# Patient Record
Sex: FEMALE | Race: WHITE | NOT HISPANIC OR LATINO | Employment: OTHER | URBAN - METROPOLITAN AREA
[De-identification: names, ages, dates, MRNs, and addresses within clinical notes are randomized per-mention and may not be internally consistent; named-entity substitution may affect disease eponyms.]

---

## 2021-05-10 ENCOUNTER — PATIENT MESSAGE (OUTPATIENT)
Dept: RESEARCH | Facility: HOSPITAL | Age: 61
End: 2021-05-10

## 2022-01-01 ENCOUNTER — TELEPHONE (OUTPATIENT)
Dept: CARDIOLOGY | Facility: CLINIC | Age: 62
End: 2022-01-01

## 2022-12-23 PROBLEM — R06.09 DOE (DYSPNEA ON EXERTION): Status: ACTIVE | Noted: 2022-01-01

## 2022-12-23 PROBLEM — J84.9 INTERSTITIAL PULMONARY DISEASE, UNSPECIFIED: Status: ACTIVE | Noted: 2022-01-01

## 2022-12-23 PROBLEM — Z86.010 PERSONAL HISTORY OF COLONIC POLYPS: Status: ACTIVE | Noted: 2022-01-01

## 2022-12-23 PROBLEM — R94.39 POSITIVE CARDIAC STRESS TEST: Status: ACTIVE | Noted: 2022-01-01

## 2022-12-28 NOTE — TELEPHONE ENCOUNTER
----- Message from Amelia Malin LPN sent at 12/23/2022  1:41 PM CST -----    ----- Message -----  From: Angela Baker  Sent: 12/23/2022   1:34 PM CST  To: Milana GARCIA Staff    Type:  Sooner Apoointment Request    Caller is requesting a sooner appointment.  Caller declined first available appointment listed below.  Caller will not accept being placed on the waitlist and is requesting a message be sent to doctor.  Name of Caller:Pt  When is the first available appointment?1/24     Symptoms:  R06.09 (ICD-10-CM) - RAMIREZ (dyspnea on exertion)  R94.39 (ICD-10-CM) - Positive cardiac stress test  R00.2 (ICD-10-CM) - Palpitations    Would the patient rather a call back or a response via [facility]? Call  Best Call Back Number:[phone]    Additional Information: Pt was referred by Dr. Lynn need a sooner appt. based on above diagnosis.

## 2022-12-29 PROBLEM — R59.0 LYMPHADENOPATHY, MEDIASTINAL: Status: ACTIVE | Noted: 2022-01-01

## 2022-12-29 PROBLEM — Z87.891 HISTORY OF TOBACCO USE DISORDER: Status: ACTIVE | Noted: 2022-01-01

## 2022-12-29 PROBLEM — R91.8 PULMONARY NODULES/LESIONS, MULTIPLE: Status: ACTIVE | Noted: 2022-01-01

## 2022-12-29 PROBLEM — J84.9 INTERSTITIAL PULMONARY DISEASE, UNSPECIFIED: Status: RESOLVED | Noted: 2022-01-01 | Resolved: 2022-01-01

## 2022-12-29 PROBLEM — J43.9 PULMONARY EMPHYSEMA: Status: ACTIVE | Noted: 2022-01-01

## 2023-01-01 ENCOUNTER — OFFICE VISIT (OUTPATIENT)
Dept: CARDIOLOGY | Facility: CLINIC | Age: 63
End: 2023-01-01
Payer: MEDICARE

## 2023-01-01 VITALS
WEIGHT: 253.06 LBS | HEIGHT: 66 IN | DIASTOLIC BLOOD PRESSURE: 61 MMHG | SYSTOLIC BLOOD PRESSURE: 135 MMHG | BODY MASS INDEX: 40.67 KG/M2 | HEART RATE: 100 BPM

## 2023-01-01 VITALS
SYSTOLIC BLOOD PRESSURE: 134 MMHG | WEIGHT: 250 LBS | HEART RATE: 97 BPM | HEIGHT: 66 IN | DIASTOLIC BLOOD PRESSURE: 69 MMHG | BODY MASS INDEX: 40.18 KG/M2

## 2023-01-01 DIAGNOSIS — J84.9 ILD (INTERSTITIAL LUNG DISEASE): ICD-10-CM

## 2023-01-01 DIAGNOSIS — R00.2 PALPITATIONS: ICD-10-CM

## 2023-01-01 DIAGNOSIS — R94.39 POSITIVE CARDIAC STRESS TEST: ICD-10-CM

## 2023-01-01 DIAGNOSIS — I10 PRIMARY HYPERTENSION: ICD-10-CM

## 2023-01-01 DIAGNOSIS — E66.01 MORBID OBESITY WITH BMI OF 40.0-44.9, ADULT: Primary | ICD-10-CM

## 2023-01-01 DIAGNOSIS — R06.09 DOE (DYSPNEA ON EXERTION): ICD-10-CM

## 2023-01-01 PROCEDURE — 99999 PR PBB SHADOW E&M-EST. PATIENT-LVL III: CPT | Mod: PBBFAC,,,

## 2023-01-01 PROCEDURE — 1159F PR MEDICATION LIST DOCUMENTED IN MEDICAL RECORD: ICD-10-PCS | Mod: CPTII,S$GLB,, | Performed by: INTERNAL MEDICINE

## 2023-01-01 PROCEDURE — 1159F PR MEDICATION LIST DOCUMENTED IN MEDICAL RECORD: ICD-10-PCS | Mod: CPTII,S$GLB,,

## 2023-01-01 PROCEDURE — 99214 OFFICE O/P EST MOD 30 MIN: CPT | Mod: S$GLB,,,

## 2023-01-01 PROCEDURE — 3075F SYST BP GE 130 - 139MM HG: CPT | Mod: CPTII,S$GLB,,

## 2023-01-01 PROCEDURE — 3008F BODY MASS INDEX DOCD: CPT | Mod: CPTII,S$GLB,,

## 2023-01-01 PROCEDURE — 99999 PR PBB SHADOW E&M-EST. PATIENT-LVL III: ICD-10-PCS | Mod: PBBFAC,,,

## 2023-01-01 PROCEDURE — 99999 PR PBB SHADOW E&M-EST. PATIENT-LVL V: CPT | Mod: PBBFAC,,, | Performed by: INTERNAL MEDICINE

## 2023-01-01 PROCEDURE — 3008F BODY MASS INDEX DOCD: CPT | Mod: CPTII,S$GLB,, | Performed by: INTERNAL MEDICINE

## 2023-01-01 PROCEDURE — 3078F DIAST BP <80 MM HG: CPT | Mod: CPTII,S$GLB,, | Performed by: INTERNAL MEDICINE

## 2023-01-01 PROCEDURE — 1160F PR REVIEW ALL MEDS BY PRESCRIBER/CLIN PHARMACIST DOCUMENTED: ICD-10-PCS | Mod: CPTII,S$GLB,, | Performed by: INTERNAL MEDICINE

## 2023-01-01 PROCEDURE — 3044F HG A1C LEVEL LT 7.0%: CPT | Mod: CPTII,S$GLB,,

## 2023-01-01 PROCEDURE — 99204 PR OFFICE/OUTPT VISIT, NEW, LEVL IV, 45-59 MIN: ICD-10-PCS | Mod: S$GLB,,, | Performed by: INTERNAL MEDICINE

## 2023-01-01 PROCEDURE — 1159F MED LIST DOCD IN RCRD: CPT | Mod: CPTII,S$GLB,, | Performed by: INTERNAL MEDICINE

## 2023-01-01 PROCEDURE — 3044F PR MOST RECENT HEMOGLOBIN A1C LEVEL <7.0%: ICD-10-PCS | Mod: CPTII,S$GLB,,

## 2023-01-01 PROCEDURE — 3078F PR MOST RECENT DIASTOLIC BLOOD PRESSURE < 80 MM HG: ICD-10-PCS | Mod: CPTII,S$GLB,,

## 2023-01-01 PROCEDURE — 3008F PR BODY MASS INDEX (BMI) DOCUMENTED: ICD-10-PCS | Mod: CPTII,S$GLB,, | Performed by: INTERNAL MEDICINE

## 2023-01-01 PROCEDURE — 3008F PR BODY MASS INDEX (BMI) DOCUMENTED: ICD-10-PCS | Mod: CPTII,S$GLB,,

## 2023-01-01 PROCEDURE — 3075F SYST BP GE 130 - 139MM HG: CPT | Mod: CPTII,S$GLB,, | Performed by: INTERNAL MEDICINE

## 2023-01-01 PROCEDURE — 99999 PR PBB SHADOW E&M-EST. PATIENT-LVL V: ICD-10-PCS | Mod: PBBFAC,,, | Performed by: INTERNAL MEDICINE

## 2023-01-01 PROCEDURE — 99204 OFFICE O/P NEW MOD 45 MIN: CPT | Mod: S$GLB,,, | Performed by: INTERNAL MEDICINE

## 2023-01-01 PROCEDURE — 3078F DIAST BP <80 MM HG: CPT | Mod: CPTII,S$GLB,,

## 2023-01-01 PROCEDURE — 99214 PR OFFICE/OUTPT VISIT, EST, LEVL IV, 30-39 MIN: ICD-10-PCS | Mod: S$GLB,,,

## 2023-01-01 PROCEDURE — 1159F MED LIST DOCD IN RCRD: CPT | Mod: CPTII,S$GLB,,

## 2023-01-01 PROCEDURE — 3075F PR MOST RECENT SYSTOLIC BLOOD PRESS GE 130-139MM HG: ICD-10-PCS | Mod: CPTII,S$GLB,,

## 2023-01-01 PROCEDURE — 1160F RVW MEDS BY RX/DR IN RCRD: CPT | Mod: CPTII,S$GLB,, | Performed by: INTERNAL MEDICINE

## 2023-01-01 PROCEDURE — 3075F PR MOST RECENT SYSTOLIC BLOOD PRESS GE 130-139MM HG: ICD-10-PCS | Mod: CPTII,S$GLB,, | Performed by: INTERNAL MEDICINE

## 2023-01-01 PROCEDURE — 3078F PR MOST RECENT DIASTOLIC BLOOD PRESSURE < 80 MM HG: ICD-10-PCS | Mod: CPTII,S$GLB,, | Performed by: INTERNAL MEDICINE

## 2023-01-01 RX ORDER — SODIUM CHLORIDE 9 MG/ML
INJECTION, SOLUTION INTRAVENOUS ONCE
Status: CANCELLED | OUTPATIENT
Start: 2023-01-01 | End: 2023-01-01

## 2023-01-01 RX ORDER — NAPROXEN SODIUM 220 MG/1
81 TABLET, FILM COATED ORAL ONCE
Status: CANCELLED | OUTPATIENT
Start: 2023-01-01 | End: 2023-01-01

## 2023-01-01 RX ORDER — CLOPIDOGREL BISULFATE 75 MG/1
300 TABLET ORAL ONCE
Status: CANCELLED | OUTPATIENT
Start: 2023-01-01 | End: 2023-01-01

## 2023-01-24 NOTE — PROGRESS NOTES
Patient, Angela Byrd (MRN #[id number]), presented with a recorded BMI of 40.85 kg/m^2 consistent with the definition of morbid obesity (ICD-10 E66.01). The patient's morbid obesity was monitored, evaluated, addressed and/or treated. This addendum to the medical record is made on 01/24/2023.

## 2023-01-24 NOTE — PATIENT INSTRUCTIONS
Angiogram    Arrive for procedure at: [facility]. 2/10/23 @ 10 AM.  YOU MAY ENTER THROUGH THE MAIN ENTRANCE.  LET THEM KNOW YOU ARE THERE FOR AN OUTPATIENT PROCEDURE.  THE PROCEDURE WILL START AT 12 NOON WITH DR.LEO WOODS.     You will receive a phone call from [facility] Pre-Op Department with further instructions prior to your scheduled procedure.      FASTING: You MAY NOT have anything to eat or drink AFTER MIDNIGHT the day before your procedure. If your procedure is scheduled in the afternoon, you may have a LIGHT BREAKFAST 6-8 hours prior to your procedure.  For example: Two slices of toast; black coffee or black tea.    MEDICATIONS: You may take your regular morning medications with water. If there are any medications that you should not take, you will be instructed to hold them for that morning.    CARDIOLOGY PRE-PROCEDURE MEDICATION ORDERS:  ** Please hold any medications that are checked below:    HOLD   # OF DAYS TO HOLD  Coumadin   Consult with Coumadin Clinic   Xarelto    _DAY BEFORE & DAY OF_  Pradaxa  _ DAY BEFORE & DAY OF _  Eliquis   _ DAY BEFORE & DAY OF _  Metformin    Day before procedure & morning of procedure  Short acting insulin   Morning of procedure    CONTINUE the Following Medications   Plavix      Effient     Aspirin    WHAT TO EXPECT:    How long will the procedure take?  The procedure will take an average of 1 - 2 hours to perform.  After the procedure, you will need to lay flat for around 4 - 6 hours to minimize bleeding from the puncture site. If the wrist is accessed you will need to keep your arm still as instructed by the nurse.    When can I go home?  You may be able to be discharged home that same afternoon if there were no complications.  If you have one of the following: balloon; stent; pacemaker or defibrillator procedures, you may spend one night for observation.  Your doctor will determine your discharge based upon your progress.  The results of your  procedure will be discussed with you before you are discharged.  Any further testing or procedures will be scheduled for you either before you leave or you will be instructed to call for a future appointment.      TRANSPORTATION:  PLEASE ARRANGE TO HAVE SOMEONE DRIVE YOU HOME FOLLOWING YOUR PROCEDURE, YOU WILL NOT BE ALLOWED TO DRIVE.

## 2023-01-24 NOTE — PROGRESS NOTES
Subjective:    Patient ID:  Angela Byrd is a 62 y.o. female who presents for evaluation of Shortness of Breath      HPI  She comes with worsening SOB with exertion  (+) stress test last year  Seeing dr Ed garcia    Review of Systems   Constitutional: Negative for decreased appetite, malaise/fatigue, weight gain and weight loss.   Cardiovascular:  Negative for chest pain, dyspnea on exertion, leg swelling, palpitations and syncope.   Respiratory:  Negative for cough and shortness of breath.    Gastrointestinal: Negative.    Neurological:  Negative for weakness.   All other systems reviewed and are negative.     Objective:      Physical Exam  Vitals and nursing note reviewed.   Constitutional:       Appearance: Normal appearance. She is well-developed.   HENT:      Head: Normocephalic.   Eyes:      Pupils: Pupils are equal, round, and reactive to light.   Neck:      Thyroid: No thyromegaly.      Vascular: No carotid bruit or JVD.   Cardiovascular:      Rate and Rhythm: Normal rate and regular rhythm.      Chest Wall: PMI is not displaced.      Pulses: Normal pulses and intact distal pulses.      Heart sounds: Normal heart sounds. No murmur heard.    No gallop.   Pulmonary:      Effort: Pulmonary effort is normal.      Breath sounds: Normal breath sounds.   Abdominal:      Palpations: Abdomen is soft. There is no mass.      Tenderness: There is no abdominal tenderness.   Musculoskeletal:         General: Normal range of motion.      Cervical back: Normal range of motion and neck supple.   Skin:     General: Skin is warm.   Neurological:      Mental Status: She is alert and oriented to person, place, and time.      Sensory: No sensory deficit.      Deep Tendon Reflexes: Reflexes are normal and symmetric.         Assessment:       1. Chronic Dyspnea On Exertion    2. Positive Cardiac Nuclear Stress Test    3. Palpitations With PACs         Plan:     L/R  heart cath

## 2023-01-31 PROBLEM — R94.2 ABNORMAL PFT: Status: ACTIVE | Noted: 2023-01-01

## 2023-01-31 PROBLEM — J43.2 CENTRIACINAR EMPHYSEMA: Status: ACTIVE | Noted: 2023-01-01

## 2023-01-31 PROBLEM — R59.9 ENLARGED LYMPH NODES: Status: ACTIVE | Noted: 2023-01-01

## 2023-01-31 PROBLEM — J43.9 PULMONARY EMPHYSEMA: Status: RESOLVED | Noted: 2022-01-01 | Resolved: 2023-01-01

## 2023-01-31 PROBLEM — Z99.81 SUPPLEMENTAL OXYGEN DEPENDENT: Status: ACTIVE | Noted: 2023-01-01

## 2023-01-31 PROBLEM — J47.9 TRACTION BRONCHIECTASIS: Status: ACTIVE | Noted: 2023-01-01

## 2023-02-23 NOTE — PROGRESS NOTES
Subjective:    Patient ID:  Angela Byrd is a 62 y.o. female patient here for evaluation Follow-up      History of Present Illness:     Mrs. Miller in a pleasant 62 year old F who follows with Dr. Cortés here today for RLHC follow up. She underwent RLHC after a small reversible defect was found in the anterolateral walls on a nuclear stress. She has chronic RAMIREZ given ILD and is on 2.5 L during the day and 4 L of home O2 at night. She follows with Dr. Ward for this. RHC revealed normal heart pressures but moderate to severe PH. LHC revealed normal coronary arteries.   Echo 9/2022 EF 60% otherwise WNL.         Review of patient's allergies indicates:   Allergen Reactions    Evista [raloxifene] Other (See Comments)     Headache, arthralgia, and leg pain    Fosamax [alendronate] Other (See Comments)     Jaw necrosis       Past Medical History:   Diagnosis Date    a Family H/O Early CAD     Dr. Frank Cortés; On ASA 81 Mg Daily    a Palpitations With PACs     Dr. Frank Cortés; On ASA 81 Mg Daily; 9/20/13 EKG = NSR With 1 PAC; 09/2013 Lab W/U = Normal    a Positive Cardiac Nuclear Stress Test 01/31/2023    Dr. Frank Cortés Has Scheduled A LHC/Angiogram On 2/10/23; On ASA 81 Mg Daily; Her 9/20/22 Cardiac Nuclear Stress Test = Was Positive    b Hypertension     09/2015 RXd Low Salt Diet     d Hyperglycemia With Family H/O DM     1/23/22 And 7/4/20 RXd Lifestyle Changes; Her Mother    e Hypothyroidism     1/26/21 RXd Levothyroxine 25 Mcg qAM     f Morbid Obesity     f Severe Osteopenia     Cannot Tolerate Biphosphonates (Jaw Necrosis) Or Evista (HAs And Arthralgias); On OTC D3 5K IU Daily And OTC CaCO3 600 Mg Daily    i Chronic Dyspnea On Exertion     Dr. Koko Ward And 12/29/22 RXd Advair BID And Albuterol MDI PRN; 12/29/22 Chest CT Scan W/O Contrast = (See Report); 9/2/22 CXR = (See Report)    i H/O 1.5 PPD X 20 Years TUD, Quit In 2004     Dr. Koko Ward; 12/29/22 Chest CT Scan W/O  "Contrast = (See Report); 9/2/22 CXR = (See Report)    i Mediastinal Lymphadenopathy     Dr. Koko Ward; 12/29/22 Chest CT Scan W/O Contrast = (See Report); 9/2/22 CXR = (See Report)    i Multiple Subcentimeter Pulmonary Nodules     Dr. Koko Ward; 12/29/22 Chest CT Scan W/O Contrast = (See Report); 9/2/22 CXR = (See Report)    i Severe Centriacinar Emphysema 01/31/2023    Dr. Koko Ward    i Supplemental Oxygen Dependence 01/31/2023    Dr. Koko Ward; Her 1/31/23 Pulse Ox Was 71% On Room Air    j Collier's Esophagus     Dr. Reid hudson GERD With 3 CM Hiatal Hernia     Dr. Reid Li    j H/O Colon Polyp On 12/20/22 Colonoscopy     Dr. Reid Li: "repeat TC In 5 Years"    j Schatzki's Ring     Dr. Reid Li; Dilated 08/2011    j Small Internal Hemorrhoids     Dr. Reid Li    l Frequent Falling     12/23/21 RXd Aerobic Exercise; This Seems To Only Be Due To Innatentiveness    l Gouty Arthropathy     l H/O Right Rotator Cuff Arthroscopic Repair 6/7/16     Dr. Fermin loomis Borderline Vitamin B12 Deficiency     1/23/22 RXd OTC Vitamin B12 2K Mcg PO Daily    n Chronic Insomnia     n Depression And Anxiety     7/22/16 RXd Lexapro 10 Mg qHS; She Is On Wellbutrin- Mg qAM    q Vitamin D Deficiency     1/26/21 Increased OTC D3 By 5K IU Daily; 7/4/20 RXd OTC D3 10K IU Daily; 6/9/19 RXd OTC D3 5K IU Daily    Wellness Visit 12/23/2022      Past Surgical History:   Procedure Laterality Date    ANGIOGRAM, CORONARY, WITH LEFT HEART CATHETERIZATION N/A 2/10/2023    Procedure: Angiogram, Coronary, with Left Heart Cath;  Surgeon: Frank Cortés MD;  Location: [facility] CATH;  Service: Cardiology;  Laterality: N/A;    RIGHT HEART CATHETERIZATION N/A 2/10/2023    Procedure: INSERTION, CATHETER, RIGHT HEART;  Surgeon: Frank Cortés MD;  Location: [facility]ATH;  Service: Cardiology;  Laterality: N/A;    ROTATOR CUFF REPAIR Right     rt patella orif      SHOULDER ARTHROSCOPY Right " 2016    Right shoulder scope with RCR     Social History     Tobacco Use    Smoking status: Former     Packs/day: 1.50     Years: 20.00     Pack years: 30.00     Types: Cigarettes     Quit date: 2004     Years since quittin.1    Smokeless tobacco: Never   Substance Use Topics    Alcohol use: Yes     Comment: 3 times per week    Drug use: No        REVIEW OF SYSTEMS: As noted in HPI   CARDIOVASCULAR: No recent chest pain, palpitations, arm, neck, or jaw pain  RESPIRATORY: No recent fever, cough chills, SOB or congestion  : No blood in the urine  GI: No Nausea, vomiting, constipation, diarrhea, blood, or reflux.  MUSCULOSKELETAL: No myalgias  NEURO: No lightheadedness or dizziness  EYES: No Double vision, blurry, vision or headache        Objective        Vitals:    23   BP: 134/69   Pulse: 97       LIPIDS - LAST 2   Lab Results   Component Value Date    CHOL 157 2023    CHOL 142 2022    HDL 58 2023    HDL 48 2022    LDLCALC 83.0 2023    LDLCALC 78.2 2022    TRIG 80 2023    TRIG 79 2022    CHOLHDL 36.9 2023    CHOLHDL 33.8 2022       CBC - LAST 2  Lab Results   Component Value Date    WBC 9.09 2023    WBC 9.99 2022    RBC 4.87 2023    RBC 4.74 2022    HGB 12.5 2023    HGB 12.7 2022    HCT 40.1 2023    HCT 40.4 2022    MCV 82 2023    MCV 85 2022    MCH 25.7 (L) 2023    MCH 26.8 (L) 2022    MCHC 31.2 (L) 2023    MCHC 31.4 (L) 2022    RDW 16.9 (H) 2023    RDW 15.9 (H) 2022     2023     2022    MPV 9.7 2023    MPV 10.1 2022    GRAN 6.1 2023    GRAN 66.8 2023    LYMPH 2.4 2023    LYMPH 26.6 2023    MONO 0.4 2023    MONO 4.4 2023    BASO 0.04 2023    BASO 0.05 2022    NRBC 0 2023    NRBC 0 2022       CHEMISTRY & LIVER FUNCTION - LAST 2  Lab  Results   Component Value Date     2023     2022    K 4.3 2023    K 4.0 2022     2023     2022    CO2 26 2023    CO2 25 2022    ANIONGAP 5 (L) 2023    ANIONGAP 7 (L) 2022    BUN 16 2023    BUN 12 2022    CREATININE 1.02 2023    CREATININE 0.89 2022    GLU 95 2023    GLU 97 2022    CALCIUM 10.2 2023    CALCIUM 10.0 2022    ALBUMIN 4.0 2023    ALBUMIN 3.8 2022    PROT 6.6 2023    PROT 6.3 2022    ALKPHOS 131 2023    ALKPHOS 142 2022    ALT 17 2023    ALT 20 2022    AST 26 2023    AST 26 2022    BILITOT 0.5 2023    BILITOT 0.5 2022        CARDIAC PROFILE - LAST 2  Lab Results   Component Value Date    CPK 35 (L) 2023        COAGULATION - LAST 2  No results found for: LABPT, INR, APTT    ENDOCRINE & PSA - LAST 2  Lab Results   Component Value Date    HGBA1C 5.7 (H) 2023    HGBA1C 5.8 (H) 2022    TSH 3.410 2023    TSH 2.150 2022        ECHOCARDIOGRAM RESULTS  Results for orders placed in visit on 22    Echo    Interpretation Summary  · The left ventricle is normal in size with normal systolic function.  · The estimated ejection fraction is 60%.  · Normal right ventricular size with normal right ventricular systolic function.      CURRENT/PREVIOUS VISIT EKG  Results for orders placed or performed during the hospital encounter of 16   EKG 12-lead    Collection Time: 16 10:14 AM    Narrative                                   [facility]                                                                                  Test Date:    2016  Pat Name:     GENNARO MARQUEZ            Department:     Patient ID:   [id number]                 Room:           Gender:       Female                   Technician:   SALTY  :          1960               Requested By:   Order Number:                           Reading MD:   Giorgi Olvera MD                                   Measurements  Intervals                              Axis            Rate:         72                       P:            60  KY:           172                      QRS:          21  QRSD:         82                       T:            35  QT:           384                                      QTc:          420                                                                 Interpretive Statements  Normal sinus rhythm  Normal ECG  No previous ECG available for comparison    Electronically Signed On 2016-06-01 07:48:48 CDT by Giorgi Olevra MD       No valid procedures specified.   Results for orders placed during the hospital encounter of 09/20/22    Nuclear Stress - Cardiology Interpreted    Interpretation Summary    The EKG portion of this study is negative for ischemia.    There is a mild intensity, small sized, reversible perfusion abnormality in the lateral wall.    There is a moderate intensity, fixed perfusion abnormality in the anterior wall of the left ventricle, likely secondary to breast attenuation.    The visually estimated ejection fraction is hyperdynamic .    LV cavity size is normal    No valid procedures specified.    PHYSICAL EXAM  CONSTITUTIONAL: Well built, well nourished in no apparent distress, obese   NECK: no carotid bruit, no JVD  LUNGS: diminished lung sounds   CHEST WALL: no tenderness  HEART: regular rate and rhythm, S1, S2 normal, no murmur, click, rub or gallop   ABDOMEN: soft, non-tender; bowel sounds normal; no masses,  no organomegaly  EXTREMITIES: Extremities normal, no edema, no calf tenderness noted  NEURO: AAO X 3    I HAVE REVIEWED :    The vital signs, nurses notes, and all the pertinent radiology and labs.    Current Outpatient Medications   Medication Instructions    albuterol (PROAIR HFA) 90 mcg/actuation inhaler 2 puffs, Inhalation, Every 4-6 hours PRN, Rescue    aspirin (ECOTRIN) 81 mg,  Oral, Daily, ASPIRIN 81 MG TABS    buPROPion (WELLBUTRIN XL) 300 mg, Oral, Daily    calcium carbonate-vitamin D3 600 mg-20 mcg (800 unit) Chew CALTRATE 600+D TABS    cyanocobalamin (vitamin B-12) 2,000 mcg, Oral, Daily    EScitalopram oxalate (LEXAPRO) 10 MG tablet TAKE 1 TABLET BY MOUTH ONCE DAILY IN THE EVENING    fluticasone-salmeterol 230-21 mcg/dose (ADVAIR HFA) 230-21 mcg/actuation HFAA inhaler 2 puffs, Inhalation, 2 times daily, Controller    irbesartan-hydrochlorothiazide (AVALIDE) 150-12.5 mg per tablet 1 tablet, Oral, Every morning    levothyroxine (SYNTHROID) 25 mcg, Oral, Before breakfast    omeprazole (PRILOSEC) 40 MG capsule Take 1 capsule by mouth once daily    tiotropium bromide (SPIRIVA RESPIMAT) 2.5 mcg/actuation inhaler 2 puffs, Inhalation, Daily, Controller    zolpidem (AMBIEN) 10 mg, Oral, Nightly PRN        Assessment & Plan     ILD (interstitial lung disease)  F/u Dr. Ward April 4   Mod severe PH seen on [facility]   Uses 2.5 L O2 during day and 4 L at night     Positive Cardiac Nuclear Stress Test  Normal coronaries seen on [facility] 2/2023       Hypertension  BP well controlled   Continue avalide   Low Na diet     Palpitations With PACs  Resolved     Chronic Dyspnea On Exertion  Normal echo   Normal [facility] with mod serve PH, has ILD -- follows with pulm           Follow up in about 1 year (around 2/23/2024).

## 2023-03-12 PROBLEM — I27.20 PULMONARY HYPERTENSION: Status: ACTIVE | Noted: 2023-01-01

## 2023-03-12 PROBLEM — R94.39 POSITIVE CARDIAC STRESS TEST: Status: RESOLVED | Noted: 2022-01-01 | Resolved: 2023-01-01

## 2023-04-04 PROBLEM — R09.02 HYPOXEMIA: Status: ACTIVE | Noted: 2023-01-01

## 2023-04-04 PROBLEM — R60.0 LOWER EXTREMITY EDEMA: Status: ACTIVE | Noted: 2023-01-01

## 2023-04-04 PROBLEM — L03.115 CELLULITIS OF RIGHT LOWER EXTREMITY: Status: ACTIVE | Noted: 2023-01-01

## 2023-04-04 PROBLEM — J96.01 ACUTE HYPOXEMIC RESPIRATORY FAILURE: Status: ACTIVE | Noted: 2023-01-01

## 2023-04-04 PROBLEM — I26.99 ACUTE PULMONARY EMBOLISM: Status: ACTIVE | Noted: 2023-01-01

## 2023-04-04 PROBLEM — I27.21 PULMONARY ARTERIAL HYPERTENSION: Status: ACTIVE | Noted: 2023-01-01

## 2023-04-06 PROBLEM — I82.411 ACUTE DEEP VEIN THROMBOSIS (DVT) OF FEMORAL VEIN OF RIGHT LOWER EXTREMITY: Status: ACTIVE | Noted: 2023-01-01

## 2023-05-05 ENCOUNTER — TELEPHONE (OUTPATIENT)
Dept: CARDIOLOGY | Facility: CLINIC | Age: 63
End: 2023-05-05
Payer: MEDICARE

## 2023-05-05 NOTE — TELEPHONE ENCOUNTER
Scot w/ [facility], informed her that Dr. Chan will not be signing off on her death certificate and should be sent to Dr. Ward    ----- Message from Nuria Martinez LPN sent at 2023 11:04 AM CDT -----  Contact: Bianca [phone]  Can you sign death certificate in [place] ?  Thank you   ----- Message -----  From: Sameera Pedersen  Sent: 2023   9:09 AM CDT  To: Milana GARCIA Staff    Type: Needs Medical Advice  Who Called:Bianca kang/[facility]     Best Call Back Number: [phone]  Additional Information: Bianca stated she faxed over death certificate yesterday and is needing it back today by 12. She stated the state only gives them 24hr to get this completed. Pls call back and advise